# Patient Record
Sex: MALE | Race: WHITE | NOT HISPANIC OR LATINO | URBAN - METROPOLITAN AREA
[De-identification: names, ages, dates, MRNs, and addresses within clinical notes are randomized per-mention and may not be internally consistent; named-entity substitution may affect disease eponyms.]

---

## 2017-01-01 ENCOUNTER — OFFICE VISIT - HEALTHEAST (OUTPATIENT)
Dept: GERIATRICS | Facility: CLINIC | Age: 82
End: 2017-01-01

## 2017-01-01 ENCOUNTER — COMMUNICATION - HEALTHEAST (OUTPATIENT)
Dept: CARDIOLOGY | Facility: CLINIC | Age: 82
End: 2017-01-01

## 2017-01-01 ENCOUNTER — AMBULATORY - HEALTHEAST (OUTPATIENT)
Dept: GERIATRICS | Facility: CLINIC | Age: 82
End: 2017-01-01

## 2017-01-01 DIAGNOSIS — K59.00 CONSTIPATION, UNSPECIFIED CONSTIPATION TYPE: ICD-10-CM

## 2017-01-01 DIAGNOSIS — M15.9 OSTEOARTHRITIS OF MULTIPLE JOINTS, UNSPECIFIED OSTEOARTHRITIS TYPE: ICD-10-CM

## 2017-01-01 DIAGNOSIS — N40.0 BPH (BENIGN PROSTATIC HYPERPLASIA): ICD-10-CM

## 2017-01-01 DIAGNOSIS — I35.0 AORTIC VALVE STENOSIS: ICD-10-CM

## 2017-01-01 DIAGNOSIS — R33.9 URINARY RETENTION: ICD-10-CM

## 2017-01-01 DIAGNOSIS — I10 ESSENTIAL HYPERTENSION WITH GOAL BLOOD PRESSURE LESS THAN 140/90: ICD-10-CM

## 2017-01-01 DIAGNOSIS — R00.1 BRADYCARDIA: ICD-10-CM

## 2017-01-01 DIAGNOSIS — I25.10 CORONARY ARTERY DISEASE INVOLVING NATIVE CORONARY ARTERY OF NATIVE HEART WITHOUT ANGINA PECTORIS: ICD-10-CM

## 2017-01-01 DIAGNOSIS — I25.9 ISCHEMIC HEART DISEASE: ICD-10-CM

## 2017-01-01 DIAGNOSIS — N39.0 URINARY TRACT INFECTION: ICD-10-CM

## 2017-01-01 DIAGNOSIS — N40.0 BPH (BENIGN PROSTATIC HYPERTROPHY): ICD-10-CM

## 2017-01-01 DIAGNOSIS — E87.6 HYPOKALEMIA: ICD-10-CM

## 2017-01-01 DIAGNOSIS — R60.0 BILATERAL EDEMA OF LOWER EXTREMITY: ICD-10-CM

## 2017-01-01 DIAGNOSIS — M15.9 DJD (DEGENERATIVE JOINT DISEASE), MULTIPLE SITES: ICD-10-CM

## 2017-01-01 DIAGNOSIS — M15.0 PRIMARY OSTEOARTHRITIS INVOLVING MULTIPLE JOINTS: ICD-10-CM

## 2017-01-01 DIAGNOSIS — R63.5 WEIGHT GAIN: ICD-10-CM

## 2017-01-01 DIAGNOSIS — M19.90 DJD (DEGENERATIVE JOINT DISEASE): ICD-10-CM

## 2017-01-01 DIAGNOSIS — R60.0 LOWER EXTREMITY EDEMA: ICD-10-CM

## 2017-01-01 DIAGNOSIS — R60.0 LEG EDEMA: ICD-10-CM

## 2017-01-01 DIAGNOSIS — M54.9 BACK PAIN: ICD-10-CM

## 2017-01-01 DIAGNOSIS — I25.10 CAD (CORONARY ARTERY DISEASE): ICD-10-CM

## 2017-01-01 DIAGNOSIS — K21.9 GASTROESOPHAGEAL REFLUX DISEASE WITHOUT ESOPHAGITIS: ICD-10-CM

## 2017-01-01 RX ORDER — FUROSEMIDE 20 MG
20 TABLET ORAL DAILY
Status: SHIPPED | COMMUNITY
Start: 2017-01-01

## 2017-01-23 ENCOUNTER — OFFICE VISIT - HEALTHEAST (OUTPATIENT)
Dept: GERIATRICS | Facility: CLINIC | Age: 82
End: 2017-01-23

## 2017-01-23 DIAGNOSIS — I35.0 AORTIC VALVE STENOSIS: ICD-10-CM

## 2017-01-23 DIAGNOSIS — I10 ESSENTIAL HYPERTENSION: ICD-10-CM

## 2017-01-23 DIAGNOSIS — K21.9 GASTROESOPHAGEAL REFLUX DISEASE WITHOUT ESOPHAGITIS: ICD-10-CM

## 2017-01-23 DIAGNOSIS — M15.0 PRIMARY OSTEOARTHRITIS INVOLVING MULTIPLE JOINTS: ICD-10-CM

## 2017-01-23 DIAGNOSIS — I25.10 CAD (CORONARY ARTERY DISEASE): ICD-10-CM

## 2017-01-23 DIAGNOSIS — N40.0 BPH (BENIGN PROSTATIC HYPERTROPHY): ICD-10-CM

## 2017-01-23 DIAGNOSIS — R00.1 BRADYCARDIA: ICD-10-CM

## 2017-01-23 DIAGNOSIS — K59.00 CONSTIPATION, UNSPECIFIED CONSTIPATION TYPE: ICD-10-CM

## 2017-01-23 DIAGNOSIS — I25.9 ISCHEMIC HEART DISEASE: ICD-10-CM

## 2018-01-01 ENCOUNTER — RECORDS - HEALTHEAST (OUTPATIENT)
Dept: LAB | Facility: CLINIC | Age: 83
End: 2018-01-01

## 2018-01-01 ENCOUNTER — HOME CARE/HOSPICE - HEALTHEAST (OUTPATIENT)
Dept: HOSPICE | Facility: HOSPICE | Age: 83
End: 2018-01-01

## 2018-01-01 LAB
ALBUMIN UR-MCNC: NEGATIVE MG/DL
AMORPH CRY #/AREA URNS HPF: ABNORMAL /[HPF]
APPEARANCE UR: ABNORMAL
BACTERIA #/AREA URNS HPF: ABNORMAL HPF
BACTERIA SPEC CULT: ABNORMAL
BACTERIA SPEC CULT: ABNORMAL
BILIRUB UR QL STRIP: NEGATIVE
COLOR UR AUTO: YELLOW
GLUCOSE UR STRIP-MCNC: NEGATIVE MG/DL
HGB UR QL STRIP: ABNORMAL
KETONES UR STRIP-MCNC: NEGATIVE MG/DL
LEUKOCYTE ESTERASE UR QL STRIP: ABNORMAL
MUCOUS THREADS #/AREA URNS LPF: ABNORMAL LPF
NITRATE UR QL: POSITIVE
PH UR STRIP: 6.5 [PH] (ref 4.5–8)
RBC #/AREA URNS AUTO: ABNORMAL HPF
SP GR UR STRIP: 1.01 (ref 1–1.03)
SQUAMOUS #/AREA URNS AUTO: ABNORMAL LPF
UROBILINOGEN UR STRIP-ACNC: ABNORMAL
WBC #/AREA URNS AUTO: ABNORMAL HPF

## 2018-02-16 ENCOUNTER — AMBULATORY - HEALTHEAST (OUTPATIENT)
Dept: GERIATRICS | Facility: CLINIC | Age: 83
End: 2018-02-16

## 2018-02-16 ENCOUNTER — HOME CARE/HOSPICE - HEALTHEAST (OUTPATIENT)
Dept: HOSPICE | Facility: HOSPICE | Age: 83
End: 2018-02-16

## 2021-05-30 VITALS — WEIGHT: 183.4 LBS

## 2021-05-30 VITALS — WEIGHT: 171.9 LBS

## 2021-05-31 VITALS — WEIGHT: 195 LBS

## 2021-05-31 VITALS — WEIGHT: 193.6 LBS

## 2021-05-31 VITALS — WEIGHT: 187.2 LBS

## 2021-06-03 ENCOUNTER — RECORDS - HEALTHEAST (OUTPATIENT)
Dept: ADMINISTRATIVE | Facility: CLINIC | Age: 86
End: 2021-06-03

## 2021-06-08 NOTE — PROGRESS NOTES
Code Status:  DNR/DNI  Visit Type: Review Of Multiple Medical Conditions     Facility:  [facility] [[id number]]         Facility Type:  (Long Term Care, LTC)    History of Present Illness: Chintan Blevins is a 89 y.o. male who I am seeing today for follow up of chronic medical conditions. Pt has hx of DJD, ischemic heart disease, BPH,  GERDs and hypertension. Pt recently had an episode of chest pain, non radiating with no SOB. He was treated with nitro X 2 and pain resolved. He has had no further episodes. He does have asymptomatic bradycardia which has been chronic. He reports no dizziness or loss of consciousness.     Active Ambulatory Problems     Diagnosis Date Noted     Aortic valve stenosis, mild 01/08/2015     CAD (coronary artery disease) 01/08/2015     Hypertension 01/08/2015     Chest pain 01/08/2015     DJD (degenerative joint disease), multiple sites 09/28/2015     BPH (benign prostatic hypertrophy) 09/28/2015     Ischemic heart disease 09/28/2015     Skin lesion 09/28/2015     Gastroesophageal reflux disease 11/30/2015     Resolved Ambulatory Problems     Diagnosis Date Noted     No Resolved Ambulatory Problems     Past Medical History   Diagnosis Date     Atrioventricular block, first degree      Bradycardia      Compression fracture of T12 vertebra      Constipation      Dementia      Diverticulosis of large intestine without diverticulitis      DVT (deep venous thrombosis)      Dyslipidemia      GI hemorrhage      Glaucoma      MI (myocardial infarction) 2011     Osteoarthritis      Premature beats      Pulmonary hypertension      T12 compression fracture      Unstable angina        Updated MAR reviewed at the facility.     Current Outpatient Prescriptions   Medication Sig     acetaminophen (TYLENOL) 500 MG tablet Take 1,000 mg by mouth 3 (three) times a day.      aspirin 81 MG EC tablet Take 81 mg by mouth daily with breakfast.      bismuth subsalicylate (PEPTO BISMOL) 262 mg/15  "mL suspension Take 30 mL by mouth. X1 ONLY IF NEEDED     calcium carbonate-vit D3-min 600 mg calcium- 200 unit Tab Take 1 capsule by mouth 2 (two) times a day.      dorzolamide-timolol, PF, 2-0.5 % Dpet Administer 1 drop to the right eye 3 (three) times a day.     furosemide (LASIX) 20 MG tablet Take 20 mg by mouth 2 (two) times a day.      hydrALAZINE (APRESOLINE) 50 MG tablet Take 50 mg by mouth 3 (three) times a day. Hold for SBP less than 110 and HR less 55     latanoprost (XALATAN) 0.005 % ophthalmic solution Administer 1 drop to both eyes bedtime.      lisinopril (PRINIVIL,ZESTRIL) 20 MG tablet Take 20 mg by mouth 2 (two) times a day. Hold for SBP less 110 and HR less 55     menthol (ASPERCREME HEAT) 10 % Gel Apply 1 application topically 2 (two) times a day. Apply to Right knee pain     menthol (ASPERCREME HEAT) 10 % Gel Apply 1 application topically 2 (two) times a day as needed.     menthol-zinc oxide (RISAMINE) 0.44-20.6 % Oint ointment Apply 1 application topically 2 (two) times a day as needed (TO PENIS AREA).      metoprolol (LOPRESSOR) 25 MG tablet Take 25 mg by mouth daily. Hold for SBP less 110 and HR less 55.     multivitamin with minerals tablet Take 1 tablet by mouth daily.     nitroglycerin (NITROSTAT) 0.4 MG SL tablet Place 0.4 mg under the tongue every 5 (five) minutes as needed for chest pain (for a total of 3 tabs in 15 minutes. if needed for chest pain. ATTENTION NURSES!!! Please thouroughly assess pain to eliminate heartburn when resident report \"Chest pain.\" Per family, first ambulate resident, if this does not allieviate discomfort, administer Maalox. IF still no relief update family and MD.).      omeprazole (PRILOSEC) 20 MG capsule Take 20 mg by mouth every morning.      potassium chloride (KLOR-CON) 10 MEQ CR tablet Take 30 mEq by mouth 3 (three) times a day.      senna-docusate (PERICOLACE) 8.6-50 mg tablet Take 1 tablet by mouth 2 (two) times a day. hold if loose stool or > 2 bm's in " 1 day     simvastatin (ZOCOR) 10 MG tablet Take 10 mg by mouth bedtime.      tamsulosin (FLOMAX) 0.4 mg Cp24 Take 0.4 mg by mouth daily.      traMADol (ULTRAM) 50 mg tablet Take 25 mg by mouth 2 (two) times a day as needed for pain.         Allergies   Allergen Reactions     Aricept [Donepezil]      Codeine      Morphine          Review of Systems   No fevers or chills. No headache, lightheadedness or dizziness. No SOB, chest pains or palpitations. Recent chest pain relieved with Nitro X 2. No further episodes. Appetite is good. No nausea, vomiting, constipation or diarrhea. No GERD like symptoms. Chronic left knee pain and LE edema. No dysuria, frequency, burning or pain with urination. Otherwise review of systems are negative.       Physical Exam   PHYSICAL EXAMINATION:  Vital signs:   Vitals:    01/23/17 1421   BP: 160/80   Pulse: 68   Resp: 24   Temp: 98.1  F (36.7  C)   SpO2: 95%     General: Awake, Alert, appropriately, follows simple commands, conversant  HEENT:PERRLA, Pink conjunctiva, anicteric sclerae, moist oral mucosa, large prominence of nose.   NECK: Supple, without any lymphadenopathy, or masses  CVS:  S1  S2, without murmur or gallop.   LUNG: Clear to auscultation, No wheezes, rales or rhonci.  BACK: No kyphosis of the thoracic spine  ABDOMEN: Soft, nontender to palpation, with positive bowel sounds  EXTREMITIES: L knee effusion below patella chronic, unable to tolerate touch.  Limited ROM extended on wheelchair extender. Chronic lymphedema to BLE. edema, no calf tenderness.   SKIN: Warm and dry, no rashes or erythema noted  NEUROLOGIC: Intact, pulses palpable  PSYCHIATRIC: Mild cognitive deficit. Flat affect.     Assessment and Plan:  1. CAD (coronary artery disease)     2. Ischemic heart disease     3. Aortic valve stenosis     4. Primary osteoarthritis involving multiple joints     5. Essential hypertension     6. Bradycardia     7. BPH (benign prostatic hypertrophy)     8. Constipation,  unspecified constipation type     9. Gastroesophageal reflux disease without esophagitis       Pt with known ASCVD with CAD and Ischemic heart disease. Pt with recent chest pain relieved with Nitro X 2. He has had no further symptoms. He continues on mx antihypertensives including hydralazine, lisinopril and metoprolol. Blood pressures well controlled. He has asymptomatic bradycardia which is chronic with heart rates in the upper 50s to 70s. He may benefit from decrease in Metoprolol and addition of isosorbide. I will consult Dr. Echevarria. He continues on ASA and statin. Chronic debilitating DJD with chronic right knee pain. He continues on Tramadol, tylenol and aspercreme. He continues in a knee splint and wheel chair extender.  BPH controlled with Flomax. No urinary retention. Constipation controlled with Senna. Some increased cognitive impairment.     Electronically signed by: Ember Nolasco, SUSAN

## 2021-06-09 NOTE — PROGRESS NOTES
Code Status:  DNR/DNI  Visit Type: Review Of Multiple Medical Conditions     Facility:  [facility] [[id number]]         Facility Type:  (Long Term Care, LTC)    History of Present Illness: Chintan Blevins is a 89 y.o. male who I am seeing today for follow up of chronic medical conditions. Pt with hx of DJD,LE edema,  ischemic heart disease, BPH,  constipation and hypertension. Pt with increasing weight gain. He lasix was recently discontinued. He has increased LE edema. He reports occasional chest pain lasting ~5 minutes. He did not notified staff when he has these episodes. No radiation identified by pt. Vague symptomology.  Increasing blood pressures with low pulses. His Metoprolol was recently increased. He is sedentary. He has debilitating OA to his R knee.       Active Ambulatory Problems     Diagnosis Date Noted     Aortic valve stenosis, mild 01/08/2015     CAD (coronary artery disease) 01/08/2015     Hypertension 01/08/2015     Chest pain 01/08/2015     DJD (degenerative joint disease), multiple sites 09/28/2015     BPH (benign prostatic hypertrophy) 09/28/2015     Ischemic heart disease 09/28/2015     Skin lesion 09/28/2015     Gastroesophageal reflux disease 11/30/2015     Resolved Ambulatory Problems     Diagnosis Date Noted     No Resolved Ambulatory Problems     Past Medical History:   Diagnosis Date     Aortic valve stenosis, mild 2014     Atrioventricular block, first degree      BPH (benign prostatic hypertrophy)      Bradycardia      CAD (coronary artery disease) 2011     Chest pain      Compression fracture of T12 vertebra      Constipation      Dementia      Diverticulosis of large intestine without diverticulitis      DJD (degenerative joint disease), multiple sites      DVT (deep venous thrombosis)      Dyslipidemia      GI hemorrhage      Glaucoma      Hypertension      MI (myocardial infarction) 2011     Osteoarthritis      Premature beats      Pulmonary hypertension       "T12 compression fracture      Unstable angina        Updated MAR reviewed at the facility.     Current Outpatient Prescriptions   Medication Sig     acetaminophen (TYLENOL) 500 MG tablet Take 1,000 mg by mouth 3 (three) times a day.      aspirin 81 MG EC tablet Take 81 mg by mouth daily with breakfast.      bismuth subsalicylate (PEPTO BISMOL) 262 mg/15 mL suspension Take 30 mL by mouth. X1 ONLY IF NEEDED     calcium carbonate-vit D3-min 600 mg calcium- 200 unit Tab Take 1 capsule by mouth 2 (two) times a day.      dorzolamide-timolol, PF, 2-0.5 % Dpet Administer 1 drop to the right eye 3 (three) times a day.     furosemide (LASIX) 20 MG tablet Take 20 mg by mouth 2 (two) times a day.      hydrALAZINE (APRESOLINE) 50 MG tablet Take 50 mg by mouth 3 (three) times a day. Hold for SBP less than 110 and HR less 55     latanoprost (XALATAN) 0.005 % ophthalmic solution Administer 1 drop to both eyes bedtime.      lisinopril (PRINIVIL,ZESTRIL) 20 MG tablet Take 20 mg by mouth 2 (two) times a day. Hold for SBP less 110 and HR less 55     menthol (ASPERCREME HEAT) 10 % Gel Apply 1 application topically 2 (two) times a day. Apply to Right knee pain     menthol (ASPERCREME HEAT) 10 % Gel Apply 1 application topically 2 (two) times a day as needed.     menthol-zinc oxide (RISAMINE) 0.44-20.6 % Oint ointment Apply 1 application topically 2 (two) times a day as needed (TO PENIS AREA).      metoprolol (LOPRESSOR) 25 MG tablet Take 25 mg by mouth daily. Hold for SBP less 110 and HR less 55.     multivitamin with minerals tablet Take 1 tablet by mouth daily.     nitroglycerin (NITROSTAT) 0.4 MG SL tablet Place 0.4 mg under the tongue every 5 (five) minutes as needed for chest pain (for a total of 3 tabs in 15 minutes. if needed for chest pain. ATTENTION NURSES!!! Please thouroughly assess pain to eliminate heartburn when resident report \"Chest pain.\" Per family, first ambulate resident, if this does not allieviate discomfort, " administer Maalox. IF still no relief update family and MD.).      omeprazole (PRILOSEC) 20 MG capsule Take 20 mg by mouth every morning.      potassium chloride (KLOR-CON) 10 MEQ CR tablet Take 30 mEq by mouth 3 (three) times a day.      senna-docusate (PERICOLACE) 8.6-50 mg tablet Take 1 tablet by mouth 2 (two) times a day. hold if loose stool or > 2 bm's in 1 day     simvastatin (ZOCOR) 10 MG tablet Take 10 mg by mouth bedtime.      tamsulosin (FLOMAX) 0.4 mg Cp24 Take 0.4 mg by mouth daily.      traMADol (ULTRAM) 50 mg tablet Take 25 mg by mouth 2 (two) times a day as needed for pain.         Allergies   Allergen Reactions     Aricept [Donepezil]      Codeine      Morphine          Review of Systems   No fevers or chills. No headache, lightheadedness or dizziness. No SOB, recent chest pains without radiation. He did not tell the nursing staff.  No palpitations. Reports no symptoms with bradycardia. Appetite is good. No nausea, vomiting, constipation or diarrhea. No GERD like symptoms. Chronic left knee pain. Increased LE edema. No dysuria, frequency, burning or pain with urination. Reports increased tiredness.     Physical Exam   PHYSICAL EXAMINATION:  Vital signs:   Vitals:    03/27/17 2046   BP: 134/76   Pulse: (!) 56   Resp: 18   Temp: 98  F (36.7  C)   SpO2: 93%     General: Awake, Alert, appropriately, follows simple commands, conversant  HEENT:PERRLA, Pink conjunctiva, anicteric sclerae, moist oral mucosa, large prominence of nose.   NECK: Supple, without any lymphadenopathy, or masses  CVS:  S1  S2, without murmur or gallop.   LUNG: Clear to auscultation, No wheezes, rales or rhonci. No dyspnea at rest.   BACK: No kyphosis of the thoracic spine  ABDOMEN: Soft, nontender to palpation, with positive bowel sounds  EXTREMITIES: L knee effusion below patella chronic, unable to tolerate touch.  Limited ROM extended on wheelchair extender. Increased bilateral LE 3-4+.   SKIN: Warm and dry, no rashes or erythema  noted  NEUROLOGIC: Intact, pulses palpable  PSYCHIATRIC: Mild cognitive deficit. Flat affect.     Assessment and Plan:  1. Essential hypertension with goal blood pressure less than 140/90     2. Bradycardia     3. Leg edema     4. CAD (coronary artery disease)     5. DJD (degenerative joint disease), multiple sites     6. BPH (benign prostatic hypertrophy)     7. Aortic valve stenosis     8. Ischemic heart disease     9. Constipation, unspecified constipation type       Pt with increasing LE edema, recent CP and tiredness. Recently his lasix was discontinued. I will resume lasix at 20mg QD. He did not tell the nursing staff about CP event. He denies any radiation, associated SOB or palpations. He has known  ASCVD with CAD and Ischemic heart disease. I feel this could be related to fluid overload. He continues on Hydralazine 50mg TID, Lisinopril 20mg BID and Metoprolol 37.5 BID. This was also recently increased by Dr. Echevarria. He has bradycardia in which he has been asymptomatic but now with reports of tiredness. I will decrease to 25mg BID. I will also add isosorbide 5 mg QD. If syptoms or elevated BP persists would further workup and increase Isosorbide. He continues on ASA. He has prn Nitro. DJD of mx joints > on right knee. He continues on tramadol and Aspercreme. Recently Tylenol decreased. Will follow up with BMP.           Electronically signed by: Ember Nolasco CNP

## 2021-06-09 NOTE — PROGRESS NOTES
[facility]    Facility:   [facility]  [[id number]]   Code Status: DNR/DNI     Blood pressures have been showing progressive rise  He has reached systolic's of 200 this past 24 hours.  Weight has risen 3 pounds in the past month.  He's been afebrile.  No other change in medications.  Current blood pressure regimen consists of hydralazine 50 mg TID, lisinopril 20 mg BID, metoprolol 5 mg b.i.d.  Pulse rates range from 60 to 84.  Exam patient is sitting comfortably in his wheelchair willing himself up and down the villarreal.  Appears cheerful and no distress.  Assessment worsening blood pressure.  Plan increase metoprolol to 37.5 mg twice daily ultimately 250 mg twice daily if he tolerates.  If not improving and change hydralazine to amlodipine.  If continues to show progressive rising blood pressure then further evaluation including laboratory assessment and more detailed physical exam        Electronically signed by: Tyrone Echevarria MD

## 2021-06-09 NOTE — PROGRESS NOTES
[facility]    Facility:   [facility]  [[id number]]   Code Status: DNR/DNI     89-year-old man who has resided in long-term care for the past 5 years.  Morbidities include dementia and diffuse osteoarthritis that is limiting activity.  Patient is being seen today to review multiple medical problems    1. Aortic valve stenosis  No clinical signs of heart failure.  He denies orthopnea, PND, chest discomfort with effort, lightheadedness or dizziness.  On auscultation the murmur is not audible.  Heart rate is regular.    Assessment - aortic stenosis of minimal clinical significance due to his limited physical activity   2. Primary osteoarthritis involving multiple joints  Multiple joints.  The main areas of disability are knee pain especially on the right.  He has had chronic persisting pain with activity in the right knee.  Multiple interventions including steroid injections in orthopedic consultation have not led to reduction in his complaint. On the exam today he demonstrates no pain behaviors except when the patella is touched  he winces.  Exam of the knee reveals no heat, erythema, effusion, periarticular swelling, or palpable crepitance.  He is sensitive to light touch.  He gets some symptom relief from a neoprene knee sleeve.  Plan - continue supportive care.  He has been receiving scheduled APAP twice daily.  Will change this to PRN and see if he manages his pain the same as when he has scheduled meds   3. Essential hypertension with goal blood pressure less than 140/90  Multi-drug regimen that includes hydralazine lisinopril metoprolol and furosemide with potassium supplement. Discussed the affected his blood pressures are low normal.  Will begin slowly reducing his medication load while monitoring for worsening blood pressure   4. Constipation, unspecified constipation type  Receives a senna plus D once daily.  States that his bowels move adequately although not  "\"predictably\".  He is not uncomfortable.   5. Ischemic heart disease  Past history coronary artery disease.  He denies heart related symptoms.  Exam  Neck veins flat.  Lung sounds are clear.  Heart rate regular.  No audible murmur.  No abdominal bruit.  No distal edema.     Assessment - stable medical management.  Plan  - discontinue scheduled Tylenol.  Tylenol PRN.  Discontinue furosemide and KCl.  Monitor weight, blood pressure, cardistatus over the next month. If no deterioration in functional performance then begin weaning from hydralazine and reduce lisinopril    Electronically signed by: Tyrone Echevarria MD    "

## 2021-06-10 NOTE — PROGRESS NOTES
Code Status:  DNR/DNI  Visit Type: Review Of Multiple Medical Conditions     Facility:  [facility] [[id number]]         Facility Type:  (Long Term Care, LTC)    History of Present Illness: Chintan Blevins is a 89 y.o. male who I am seeing today for follow up of chronic medical conditions. Pt with hx of DJD,LE edema,  ischemic heart disease, BPH,  constipation and hypertension.  Patient's Lasix has been resumed secondary to increased weight gain edema and some vague symptomatology of chest pain.  No further symptomology.  I also added isosorbide.  Heart rates in the upper 50s to low 60s.      Active Ambulatory Problems     Diagnosis Date Noted     Aortic valve stenosis, mild 01/08/2015     CAD (coronary artery disease) 01/08/2015     Hypertension 01/08/2015     Chest pain 01/08/2015     DJD (degenerative joint disease), multiple sites 09/28/2015     BPH (benign prostatic hypertrophy) 09/28/2015     Ischemic heart disease 09/28/2015     Skin lesion 09/28/2015     Gastroesophageal reflux disease 11/30/2015     Resolved Ambulatory Problems     Diagnosis Date Noted     No Resolved Ambulatory Problems     Past Medical History:   Diagnosis Date     Aortic valve stenosis, mild 2014     Atrioventricular block, first degree      BPH (benign prostatic hypertrophy)      Bradycardia      CAD (coronary artery disease) 2011     Chest pain      Compression fracture of T12 vertebra      Constipation      Dementia      Diverticulosis of large intestine without diverticulitis      DJD (degenerative joint disease), multiple sites      DVT (deep venous thrombosis)      Dyslipidemia      GI hemorrhage      Glaucoma      Hypertension      MI (myocardial infarction) 2011     Osteoarthritis      Premature beats      Pulmonary hypertension      T12 compression fracture      Unstable angina        Updated MAR reviewed at the facility.     Current Outpatient Prescriptions   Medication Sig     acetaminophen (TYLENOL) 500 MG  "tablet Take 1,000 mg by mouth 3 (three) times a day.      aspirin 81 MG EC tablet Take 81 mg by mouth daily with breakfast.      bismuth subsalicylate (PEPTO BISMOL) 262 mg/15 mL suspension Take 30 mL by mouth. X1 ONLY IF NEEDED     calcium carbonate-vit D3-min 600 mg calcium- 200 unit Tab Take 1 capsule by mouth 2 (two) times a day.      dorzolamide-timolol, PF, 2-0.5 % Dpet Administer 1 drop to the right eye 3 (three) times a day.     furosemide (LASIX) 20 MG tablet Take 20 mg by mouth daily.     hydrALAZINE (APRESOLINE) 50 MG tablet Take 50 mg by mouth 3 (three) times a day. Hold for SBP less than 110 and HR less 55     latanoprost (XALATAN) 0.005 % ophthalmic solution Administer 1 drop to both eyes bedtime.      lisinopril (PRINIVIL,ZESTRIL) 20 MG tablet Take 20 mg by mouth 2 (two) times a day. Hold for SBP less 110 and HR less 55     menthol (ASPERCREME HEAT) 10 % Gel Apply 1 application topically 2 (two) times a day. Apply to Right knee pain     menthol (ASPERCREME HEAT) 10 % Gel Apply 1 application topically 2 (two) times a day as needed.     menthol-zinc oxide (RISAMINE) 0.44-20.6 % Oint ointment Apply 1 application topically 2 (two) times a day as needed (TO PENIS AREA).      metoprolol (LOPRESSOR) 25 MG tablet Take 25 mg by mouth daily. Hold for SBP less 110 and HR less 55.     multivitamin with minerals tablet Take 1 tablet by mouth daily.     nitroglycerin (NITROSTAT) 0.4 MG SL tablet Place 0.4 mg under the tongue every 5 (five) minutes as needed for chest pain (for a total of 3 tabs in 15 minutes. if needed for chest pain. ATTENTION NURSES!!! Please thouroughly assess pain to eliminate heartburn when resident report \"Chest pain.\" Per family, first ambulate resident, if this does not allieviate discomfort, administer Maalox. IF still no relief update family and MD.).      omeprazole (PRILOSEC) 20 MG capsule Take 20 mg by mouth every morning.      potassium chloride SA (K-DUR,KLOR-CON) 20 MEQ tablet Take " 20 mEq by mouth daily.     senna-docusate (PERICOLACE) 8.6-50 mg tablet Take 1 tablet by mouth 2 (two) times a day. hold if loose stool or > 2 bm's in 1 day     simvastatin (ZOCOR) 10 MG tablet Take 10 mg by mouth bedtime.      tamsulosin (FLOMAX) 0.4 mg Cp24 Take 0.4 mg by mouth daily.      traMADol (ULTRAM) 50 mg tablet Take 25 mg by mouth 2 (two) times a day as needed for pain.         Allergies   Allergen Reactions     Aricept [Donepezil]      Codeine      Morphine          Review of Systems   No fevers or chills. No headache, lightheadedness or dizziness. No SOB.  No further chest pain. No palpitations. Reports no symptoms with bradycardia. Appetite is good. No nausea, vomiting, constipation or diarrhea. No GERD like symptoms. Chronic left knee pain. Increased LE edema. No dysuria, frequency, burning or pain with urination.       Physical Exam   PHYSICAL EXAMINATION:  Vital signs:   Vitals:    05/08/17 1822   BP: 126/69   Pulse: (!) 59   Resp: 19   Temp: 97.5  F (36.4  C)   SpO2: 95%     General: Awake, Alert, appropriately, follows simple commands, conversant  HEENT:PERRLA, Pink conjunctiva, anicteric sclerae, moist oral mucosa, large prominence of nose.   NECK: Supple, without any lymphadenopathy, or masses  CVS:  S1  S2, without murmur or gallop.   LUNG: Clear to auscultation, No wheezes, rales or rhonci. No dyspnea at rest.   BACK: No kyphosis of the thoracic spine  ABDOMEN: Soft, nontender to palpation, with positive bowel sounds  EXTREMITIES: L knee effusion below patella chronic, unable to tolerate touch.  Limited ROM extended on wheelchair extender. Increased bilateral LE 3+.   SKIN: Warm and dry, no rashes or erythema noted  NEUROLOGIC: Intact, pulses palpable  PSYCHIATRIC: Mild cognitive deficit. Flat affect.     Assessment and Plan:  1. Ischemic heart disease     2. DJD (degenerative joint disease), multiple sites     3. Leg edema     4. Essential hypertension with goal blood pressure less than  140/90     5. Bradycardia     6. Aortic valve stenosis     7. BPH (benign prostatic hypertrophy)       Pt with previous vague CP symptoms.  He was seen and I added isosorbide 5 mg daily.  Previously been taken off his Lasix and was having some increased edema.  This is now improved.  He continues on Lasix.  Occasional bradycardia with heart rate in the mid 50s to low 60s.  Asymptomatic.  Blood pressures are satisfactory controlled.  No further hypertension.  He is also on potassium supplement for hypokalemia.  Potassium is stable.  DJD with chronic right knee pain.  He continues in splinting.  He is on tramadol and topical anti-inflammatory.  BPH no urinary retention.    Electronically signed by: Ember Nolasco, SUSAN

## 2021-06-10 NOTE — PROGRESS NOTES
Code Status:  DNR/DNI  Visit Type: Review Of Multiple Medical Conditions     Facility:  [facility] [[id number]]         Facility Type:  (Long Term Care, LTC)    History of Present Illness: Chintan Blevins is a 89 y.o. male who I am seeing today for follow up of chronic medical conditions. Pt with hx of DJD,LE edema,  ischemic heart disease, BPH,  constipation and hypertension. Pt recently seen  with increasing weight gain. He lasix was recently discontinued. He had increased LE edema with occasional reports of CP. No radiation identified by pt. Vague symptomology. He had increasing blood pressures with low pulses. He is on mx antihypertensives. Heart rates in the upper 50s and low 60s. I added isosorbide. His lasix was resumed. He developed hypokalemia with K+ of 3.4. He potassium supplement was resumed. Today K+ 3.3. His edema is improved. BNP was 190. He denies any further CP.       Active Ambulatory Problems     Diagnosis Date Noted     Aortic valve stenosis, mild 01/08/2015     CAD (coronary artery disease) 01/08/2015     Hypertension 01/08/2015     Chest pain 01/08/2015     DJD (degenerative joint disease), multiple sites 09/28/2015     BPH (benign prostatic hypertrophy) 09/28/2015     Ischemic heart disease 09/28/2015     Skin lesion 09/28/2015     Gastroesophageal reflux disease 11/30/2015     Resolved Ambulatory Problems     Diagnosis Date Noted     No Resolved Ambulatory Problems     Past Medical History:   Diagnosis Date     Aortic valve stenosis, mild 2014     Atrioventricular block, first degree      BPH (benign prostatic hypertrophy)      Bradycardia      CAD (coronary artery disease) 2011     Chest pain      Compression fracture of T12 vertebra      Constipation      Dementia      Diverticulosis of large intestine without diverticulitis      DJD (degenerative joint disease), multiple sites      DVT (deep venous thrombosis)      Dyslipidemia      GI hemorrhage      Glaucoma       Hypertension      MI (myocardial infarction) 2011     Osteoarthritis      Premature beats      Pulmonary hypertension      T12 compression fracture      Unstable angina        Updated MAR reviewed at the facility.     Current Outpatient Prescriptions   Medication Sig     acetaminophen (TYLENOL) 500 MG tablet Take 1,000 mg by mouth 3 (three) times a day.      aspirin 81 MG EC tablet Take 81 mg by mouth daily with breakfast.      bismuth subsalicylate (PEPTO BISMOL) 262 mg/15 mL suspension Take 30 mL by mouth. X1 ONLY IF NEEDED     calcium carbonate-vit D3-min 600 mg calcium- 200 unit Tab Take 1 capsule by mouth 2 (two) times a day.      dorzolamide-timolol, PF, 2-0.5 % Dpet Administer 1 drop to the right eye 3 (three) times a day.     furosemide (LASIX) 20 MG tablet Take 20 mg by mouth daily.     hydrALAZINE (APRESOLINE) 50 MG tablet Take 50 mg by mouth 3 (three) times a day. Hold for SBP less than 110 and HR less 55     latanoprost (XALATAN) 0.005 % ophthalmic solution Administer 1 drop to both eyes bedtime.      lisinopril (PRINIVIL,ZESTRIL) 20 MG tablet Take 20 mg by mouth 2 (two) times a day. Hold for SBP less 110 and HR less 55     menthol (ASPERCREME HEAT) 10 % Gel Apply 1 application topically 2 (two) times a day. Apply to Right knee pain     menthol (ASPERCREME HEAT) 10 % Gel Apply 1 application topically 2 (two) times a day as needed.     menthol-zinc oxide (RISAMINE) 0.44-20.6 % Oint ointment Apply 1 application topically 2 (two) times a day as needed (TO PENIS AREA).      metoprolol (LOPRESSOR) 25 MG tablet Take 25 mg by mouth daily. Hold for SBP less 110 and HR less 55.     multivitamin with minerals tablet Take 1 tablet by mouth daily.     nitroglycerin (NITROSTAT) 0.4 MG SL tablet Place 0.4 mg under the tongue every 5 (five) minutes as needed for chest pain (for a total of 3 tabs in 15 minutes. if needed for chest pain. ATTENTION NURSES!!! Please thouroughly assess pain to eliminate heartburn when  "resident report \"Chest pain.\" Per family, first ambulate resident, if this does not allieviate discomfort, administer Maalox. IF still no relief update family and MD.).      omeprazole (PRILOSEC) 20 MG capsule Take 20 mg by mouth every morning.      potassium chloride SA (K-DUR,KLOR-CON) 20 MEQ tablet Take 20 mEq by mouth daily.     senna-docusate (PERICOLACE) 8.6-50 mg tablet Take 1 tablet by mouth 2 (two) times a day. hold if loose stool or > 2 bm's in 1 day     simvastatin (ZOCOR) 10 MG tablet Take 10 mg by mouth bedtime.      tamsulosin (FLOMAX) 0.4 mg Cp24 Take 0.4 mg by mouth daily.      traMADol (ULTRAM) 50 mg tablet Take 25 mg by mouth 2 (two) times a day as needed for pain.         Allergies   Allergen Reactions     Aricept [Donepezil]      Codeine      Morphine          Review of Systems   No fevers or chills. No headache, lightheadedness or dizziness. No SOB, recent chest pains without radiation. He did not tell the nursing staff.  No palpitations. Reports no symptoms with bradycardia. Appetite is good. No nausea, vomiting, constipation or diarrhea. No GERD like symptoms. Chronic left knee pain. Increased LE edema. No dysuria, frequency, burning or pain with urination. Reports increased tiredness.     Physical Exam   PHYSICAL EXAMINATION:  Vital signs:   Vitals:    04/10/17 2140   BP: 131/66   Pulse: (!) 57   Resp: 20   Temp: 97.5  F (36.4  C)   SpO2: 95%     General: Awake, Alert, appropriately, follows simple commands, conversant  HEENT:PERRLA, Pink conjunctiva, anicteric sclerae, moist oral mucosa, large prominence of nose.   NECK: Supple, without any lymphadenopathy, or masses  CVS:  S1  S2, without murmur or gallop.   LUNG: Clear to auscultation, No wheezes, rales or rhonci. No dyspnea at rest.   BACK: No kyphosis of the thoracic spine  ABDOMEN: Soft, nontender to palpation, with positive bowel sounds  EXTREMITIES: L knee effusion below patella chronic, unable to tolerate touch.  Limited ROM extended " on wheelchair extender. Increased bilateral LE 3+.   SKIN: Warm and dry, no rashes or erythema noted  NEUROLOGIC: Intact, pulses palpable  PSYCHIATRIC: Mild cognitive deficit. Flat affect.     Assessment and Plan:  1. Essential hypertension with goal blood pressure less than 140/90     2. Leg edema     3. Hypokalemia     4. Bradycardia     5. Aortic valve stenosis     6. Ischemic heart disease     7. DJD (degenerative joint disease), multiple sites       Recent increasing LE edema with elevated blood pressures and CP. Hx of ischemic cardiomyopathy and hypertension on mx antihypertensives. I added isosorbide 5 mg QD and resumed his lasix at 20mg QD. No further CP. Asymptomatic bradycardia. Could attempt to further decrease his metoprolol and push up on isosorbide. He also continues on lisinopril 20 mg BID and hydralazine 50 mg TID. Hypokalemia. I resumed is potasium supplement. Today potassium 3.3. I will increase to 30 meq QD. Follow up K+ on Thursday. DJD with chronic left knee pain well controlled with muscle rub and tramadol.     Electronically signed by: Ember Nolasco, CNP

## 2021-06-11 NOTE — PROGRESS NOTES
Code Status:  DNR/DNI  Visit Type: Review Of Multiple Medical Conditions     Facility:  [facility] [[id number]]         Facility Type:  (Long Term Care, LTC)    History of Present Illness: Chintan Blevins is a 89 y.o. male who I am seeing today for follow up of chronic medical conditions. Pt with hx of DJD,LE edema,  ischemic heart disease, BPH,  constipation and hypertension.  Patient with increasing discomfort and lower back and bottom.  He was seen by OT for wheelchair evaluation.  Support was added to the lumbar spine as well as change in seating.  Does like to sit up for majority of the day however nursing is encouraging him to lie down.  He always keeps a wheelchair extender on the right lower extremity secondary to DJD and chronic knee pain.    Active Ambulatory Problems     Diagnosis Date Noted     Aortic valve stenosis, mild 01/08/2015     CAD (coronary artery disease) 01/08/2015     Hypertension 01/08/2015     Chest pain 01/08/2015     DJD (degenerative joint disease), multiple sites 09/28/2015     BPH (benign prostatic hypertrophy) 09/28/2015     Ischemic heart disease 09/28/2015     Skin lesion 09/28/2015     Gastroesophageal reflux disease 11/30/2015     Resolved Ambulatory Problems     Diagnosis Date Noted     No Resolved Ambulatory Problems     Past Medical History:   Diagnosis Date     Aortic valve stenosis, mild 2014     Atrioventricular block, first degree      BPH (benign prostatic hypertrophy)      Bradycardia      CAD (coronary artery disease) 2011     Chest pain      Compression fracture of T12 vertebra      Constipation      Dementia      Diverticulosis of large intestine without diverticulitis      DJD (degenerative joint disease), multiple sites      DVT (deep venous thrombosis)      Dyslipidemia      GI hemorrhage      Glaucoma      Hypertension      MI (myocardial infarction) 2011     Osteoarthritis      Premature beats      Pulmonary hypertension      T12 compression  "fracture      Unstable angina        Updated MAR reviewed at the facility.     Current Outpatient Prescriptions   Medication Sig     acetaminophen (TYLENOL) 500 MG tablet Take 1,000 mg by mouth 3 (three) times a day.      aspirin 81 MG EC tablet Take 81 mg by mouth daily with breakfast.      bismuth subsalicylate (PEPTO BISMOL) 262 mg/15 mL suspension Take 30 mL by mouth. X1 ONLY IF NEEDED     calcium carbonate-vit D3-min 600 mg calcium- 200 unit Tab Take 1 capsule by mouth 2 (two) times a day.      dorzolamide-timolol, PF, 2-0.5 % Dpet Administer 1 drop to the right eye 3 (three) times a day.     furosemide (LASIX) 20 MG tablet Take 20 mg by mouth daily.     hydrALAZINE (APRESOLINE) 50 MG tablet Take 50 mg by mouth 3 (three) times a day. Hold for SBP less than 110 and HR less 55     latanoprost (XALATAN) 0.005 % ophthalmic solution Administer 1 drop to both eyes bedtime.      lisinopril (PRINIVIL,ZESTRIL) 20 MG tablet Take 20 mg by mouth 2 (two) times a day. Hold for SBP less 110 and HR less 55     menthol (ASPERCREME HEAT) 10 % Gel Apply 1 application topically 2 (two) times a day. Apply to Right knee pain     menthol (ASPERCREME HEAT) 10 % Gel Apply 1 application topically 2 (two) times a day as needed.     menthol-zinc oxide (RISAMINE) 0.44-20.6 % Oint ointment Apply 1 application topically 2 (two) times a day as needed (TO PENIS AREA).      metoprolol (LOPRESSOR) 25 MG tablet Take 25 mg by mouth daily. Hold for SBP less 110 and HR less 55.     multivitamin with minerals tablet Take 1 tablet by mouth daily.     nitroglycerin (NITROSTAT) 0.4 MG SL tablet Place 0.4 mg under the tongue every 5 (five) minutes as needed for chest pain (for a total of 3 tabs in 15 minutes. if needed for chest pain. ATTENTION NURSES!!! Please thouroughly assess pain to eliminate heartburn when resident report \"Chest pain.\" Per family, first ambulate resident, if this does not allieviate discomfort, administer Maalox. IF still no relief " update family and MD.).      omeprazole (PRILOSEC) 20 MG capsule Take 20 mg by mouth every morning.      potassium chloride SA (K-DUR,KLOR-CON) 20 MEQ tablet Take 20 mEq by mouth daily.     senna-docusate (PERICOLACE) 8.6-50 mg tablet Take 1 tablet by mouth 2 (two) times a day. hold if loose stool or > 2 bm's in 1 day     simvastatin (ZOCOR) 10 MG tablet Take 10 mg by mouth bedtime.      tamsulosin (FLOMAX) 0.4 mg Cp24 Take 0.4 mg by mouth daily.      traMADol (ULTRAM) 50 mg tablet Take 25 mg by mouth 2 (two) times a day as needed for pain.         Allergies   Allergen Reactions     Aricept [Donepezil]      Codeine      Morphine          Review of Systems   No fevers or chills. No headache, lightheadedness or dizziness. No SOB.  No further chest pain. No palpitations. Reports no symptoms with bradycardia. Appetite is good. No nausea, vomiting, constipation or diarrhea. No GERD like symptoms. Chronic left knee pain.  Chronic LE edema. No dysuria, frequency, burning or pain with urination.  Increasing cognitive impairment.  Discomfort of the lower spine and bottom.      Physical Exam   PHYSICAL EXAMINATION:  Vital signs:   Vitals:    06/05/17 2123   BP: 141/69   Pulse: (!) 59   Resp: 18   Temp: 97.8  F (36.6  C)   SpO2: 98%     General: Awake, Alert, appropriately, follows simple commands, conversant  HEENT:PERRLA, Pink conjunctiva, anicteric sclerae, moist oral mucosa, large prominence of nose.   NECK: Supple, without any lymphadenopathy, or masses  CVS:  S1  S2, without murmur or gallop.   LUNG: Clear to auscultation, No wheezes, rales or rhonci. No dyspnea at rest.   BACK: No kyphosis of the thoracic spine no focal tenderness to the lumbar spine.. He does have lumbar support in his wheelchair.  ABDOMEN: Soft, nontender to palpation, with positive bowel sounds  EXTREMITIES: L knee effusion below patella chronic, unable to tolerate touch.  Limited ROM extended on wheelchair extender. Increased bilateral LE 3+.    SKIN: Warm and dry, no rashes or erythema noted  NEUROLOGIC: Intact, pulses palpable  PSYCHIATRIC: Mild cognitive deficit. Flat affect.  Patient does not recognize his own sweater with his name on it.    Assessment and Plan:  1. DJD (degenerative joint disease), multiple sites     2. Back pain     3. Ischemic heart disease     4. Aortic valve stenosis     5. BPH (benign prostatic hypertrophy)     6. Leg edema     7. CAD (coronary artery disease)     8. Bradycardia     9. Essential hypertension with goal blood pressure less than 140/90       Patient with you to multiple joints.  Chronic left knee pain well controlled with tramadol and topical anti-inflammatory.  Sensitive to touch.  Keeps it extended all up in wheelchair.  He does sit up for a large majority of the day.  Staff are encouraging him to lay down.  Recent discomfort in his lumbar spine as well as bottom.  He was seen by OT and has now lumbar support in his wheelchair as well as improved seating.  Continue to encourage lying down.  BPH well controlled with tamsulosin.  No obstruction or retention.  Essential hypertension.  I see one elevated blood pressure however greatly improved with recent addition of isosorbide and reinstating his Lasix.  Chronic lower extremity edema somewhat improved with resuming his Lasix.  We will continue to monitor renal function.  CAD.  No further chest pain with use of a source of I.  Asymptomatic bradycardia.      Electronically signed by: Ember Nolasco CNP

## 2021-06-12 NOTE — PROGRESS NOTES
Code Status:  DNR/DNI  Visit Type: Review Of Multiple Medical Conditions     Facility:  [facility] [[id number]]         Facility Type:  (Long Term Care, LTC)    History of Present Illness: Chintan Blevins is a 89 y.o. male who I am seeing today for follow up of chronic medical conditions including DJD with chronic right knee pain,LE edema,  ischemic heart disease, BPH,  constipation and hypertension.  Pt conditions stable. He has had some bradycardia but denies any symptoms. His hydralazine and lisinopril have been held 6 times over the last 14 days but he has continued to receive his Metoprolol.     Active Ambulatory Problems     Diagnosis Date Noted     Aortic valve stenosis, mild 01/08/2015     CAD (coronary artery disease) 01/08/2015     Hypertension 01/08/2015     Chest pain 01/08/2015     DJD (degenerative joint disease), multiple sites 09/28/2015     BPH (benign prostatic hypertrophy) 09/28/2015     Ischemic heart disease 09/28/2015     Skin lesion 09/28/2015     Gastroesophageal reflux disease 11/30/2015     Resolved Ambulatory Problems     Diagnosis Date Noted     No Resolved Ambulatory Problems     Past Medical History:   Diagnosis Date     Aortic valve stenosis, mild 2014     Atrioventricular block, first degree      BPH (benign prostatic hypertrophy)      Bradycardia      CAD (coronary artery disease) 2011     Chest pain      Compression fracture of T12 vertebra      Constipation      Dementia      Diverticulosis of large intestine without diverticulitis      DJD (degenerative joint disease), multiple sites      DVT (deep venous thrombosis)      Dyslipidemia      GI hemorrhage      Glaucoma      Hypertension      MI (myocardial infarction) 2011     Osteoarthritis      Premature beats      Pulmonary hypertension      T12 compression fracture      Unstable angina        Updated MAR reviewed at the facility.         Allergies   Allergen Reactions     Aricept [Donepezil]      Codeine       Morphine          Review of Systems   No fevers or chills. No headache, lightheadedness or dizziness. No SOB or CP. No palpitations. Reports no symptoms with bradycardia. Appetite is good. No nausea, vomiting, constipation or diarrhea. No GERD like symptoms. Chronic left knee pain.  Chronic LE edema. No dysuria, frequency, burning or pain with urination.  Increasing cognitive impairment.  Mild discomfort over the clavicle on the right.       Physical Exam   PHYSICAL EXAMINATION:  Vital signs:   Vitals:    09/11/17 1537   BP: 153/80   Pulse: (!) 58   Resp: 18   Temp: 97.8  F (36.6  C)   SpO2: 96%     General: Awake, Alert, appropriately, follows simple commands, conversant  HEENT:PERRLA, Pink conjunctiva, anicteric sclerae, moist oral mucosa, large prominence of nose.   NECK: Supple, without any lymphadenopathy, or masses  CVS:  S1  S2, without murmur or gallop. Some mild tenderness over the mid right clavicle. No bruising. Some soft tissue palpable over bone.   LUNG: Clear to auscultation, No wheezes, rales or rhonci.   BACK: No kyphosis of the thoracic spine no focal tenderness to the lumbar spine.. He does have lumbar support in his wheelchair.  ABDOMEN: Soft, nontender to palpation, with positive bowel sounds  EXTREMITIES: L knee effusion below patella chronic, unable to tolerate touch. Throws hands in the air when I attempt to palpate.  Limited ROM extended on wheelchair extender. Increased bilateral LE 3+.   SKIN: Warm and dry, no rashes or erythema noted  NEUROLOGIC: Intact, pulses palpable  PSYCHIATRIC: Mild cognitive deficit. Flat affect.      Assessment and Plan:  1. Ischemic heart disease     2. Aortic valve stenosis     3. Bradycardia     4. DJD (degenerative joint disease), multiple sites     5. Bilateral edema of lower extremity     6. Osteoarthritis of multiple joints, unspecified osteoarthritis type     7. Constipation, unspecified constipation type     8. BPH (benign prostatic hypertrophy)     9.  Essential hypertension with goal blood pressure less than 140/90     10. CAD (coronary artery disease)     11. Gastroesophageal reflux disease without esophagitis       Pt with known ischemic heart disease. No CP of recent. He continues on vasodilator. Asymptomatic bradycardia with heart rates in the 50s. He continues on Metoprolol 25 mg BID. I will decrease to 12.5mg BID. He continues on Hydralazine and lisinopril for BP control. They have held this 6 times out of the last 14 days due to low heart rate. I will remove HR parameters from these 2 meds and add this to Metoprolol. Blood pressures with rebound hypertension noted when held. Constipation improved with Senna. GERDS no longer on PPI. No complains. BPH well controlled with Flomax. Chronic LE edema. He continues on Lasix.   Chronic left knee pain well controlled with tramadol and topical anti-inflammatory.  Sensitive to touch.      Electronically signed by:   Ember Nolasco, CNP

## 2021-06-12 NOTE — PROGRESS NOTES
[facility]    Facility:   [facility]  [[id number]]   Code Status: DNR/DNI     89-year-old man who has resided in long-term care for the past 5.5 years.  Morbidities include dementia and diffuse osteoarthritis that is limiting activity.  Patient is being seen today to review multiple medical problems    1. Ischemic heart disease   known CAD.  No acute events.  Minimally physically active but denies dyspnea on exertion.  Heart rhythm is regular with normal rate.  No neck vein distention.  Lung sounds are clear.  Denies precordial pain with exertion.   2. Aortic valve stenosis   high-pitched midsystolic murmur.  No diastolic component.  No signs of heart failure.   3. Osteoarthritis of multiple joints, unspecified osteoarthritis type   multiple joints have been affected.  Primary complaint is right knee pain.  This is chronic and constant.  He agrees that it is been present for several years preceding his arrival in the long-term care.  Multiple interventions including physical therapy, NSAID's, analgesics, corticosteroid injections, and orthopedic consultation have been no available to relieve his complaint of discomfort.  What is interesting is that he does not exhibit significant pain behaviors.  Light touch causes him to be anxious and to push my hand away from examining his knee.  There is no palpable or visible effusion or discoloration.  Left knee is not uncomfortable.  If this is not a fixed psychological idea and is a true manifestation of pain, the quality of the pain has the appearance of a neuropathy.  We will see if his insurance will cover the cost of lighted cane patch or lighted Derm gel.  If not then a trial of Voltaren gel for him to apply independently might offer some help for discomfort.   4. BPH (benign prostatic hypertrophy)   no active complaints.   5. Bilateral edema of lower extremity   this waxes and wanes.  At times he has had compression dressings  applied.  He has 2+ edema of the ankles 1+ of the distal foreleg.  None of the proximal foreleg.  This is improvement compared to previous exams.   6. Constipation, unspecified constipation type   bowels are moving adequately.  He is receiving Senna D on a scheduled basis.   7. Essential hypertension with goal blood pressure less than 140/90   blood pressures normal.       Exam-vital signs reviewed over the past month.  Normal and stable.  20 pound weight gain in the past 3 months.  14 of that in the past month.  Edema appears improved  From previous exams.  General-patient is found sitting in his chair on the porch.  He is willing to be seen for examination and propels his wheelchair to his room accurately.  Appears comfortable and only exhibits a type of pain behavior when his right knee is gently touched.  HEENT-normal.  Neck-no adenopathy, thyroid enlargement or venous distention.  Lungs-clear bilaterally.  Heart-normal rate regular rhythm.  Abdomen-no distention.  Normoactive bowel sounds.  Skin-normal turgor for age.  Extremities-skin intact and well cared for.  2+ ankle edema 1+ distal foreleg edema no edema of the proximal foreleg.    Assessment-unexplained weight gain that is best accounted for by improved nutrition.  Not due to worsening edema.  Quality of life continues to be affected by chronic right knee pain.  Will try a topical such as Voltaren gel or lidocaine patch for symptom relief      Electronically signed by: Tyrone Echevarria MD

## 2021-06-13 NOTE — PROGRESS NOTES
Code Status:  DNR/DNI  Visit Type: Problem Visit     Facility:  [facility] [[id number]]         Facility Type:  (Long Term Care, LTC)    History of Present Illness: Chintan Blevins is a 89 y.o. male who I am seeing today for follow up of recent urinary rentention. Pt with chronic medical conditions including DJD with chronic right knee pain,LE edema,  ischemic heart disease, BPH,  constipation and hypertension. On last exam pt reported no voiding. Nursing staff reported that he was incontinent. I did have them obtain PVR and bladder US. He was retaining urine with residuals >400cc. An on call was notified and reyes catheter place. Urine is CYU on exam. He denies any further like symptoms. He continues on Flomax 0.4mg QD.     Active Ambulatory Problems     Diagnosis Date Noted     Aortic valve stenosis, mild 01/08/2015     CAD (coronary artery disease) 01/08/2015     Hypertension 01/08/2015     Chest pain 01/08/2015     DJD (degenerative joint disease), multiple sites 09/28/2015     BPH (benign prostatic hypertrophy) 09/28/2015     Ischemic heart disease 09/28/2015     Skin lesion 09/28/2015     Gastroesophageal reflux disease 11/30/2015     Resolved Ambulatory Problems     Diagnosis Date Noted     No Resolved Ambulatory Problems     Past Medical History:   Diagnosis Date     Aortic valve stenosis, mild 2014     Atrioventricular block, first degree      BPH (benign prostatic hypertrophy)      Bradycardia      CAD (coronary artery disease) 2011     Chest pain      Compression fracture of T12 vertebra      Constipation      Dementia      Diverticulosis of large intestine without diverticulitis      DJD (degenerative joint disease), multiple sites      DVT (deep venous thrombosis)      Dyslipidemia      GI hemorrhage      Glaucoma      Hypertension      MI (myocardial infarction) 2011     Osteoarthritis      Premature beats      Pulmonary hypertension      T12 compression fracture      Unstable angina         Updated MAR reviewed at the facility.         Allergies   Allergen Reactions     Aricept [Donepezil]      Codeine      Morphine          Review of Systems   Refer to HPI otherwise negative.        Physical Exam   PHYSICAL EXAMINATION:  Vital signs:   Vitals:    10/18/17 1411   BP: 132/78   Pulse: 69   Resp: 19   Temp: (!) 96.4  F (35.8  C)   SpO2: 96%     General: Awake, Alert, appropriately, follows simple commands, conversant   NECK: Supple   BACK: No kyphosis  ABDOMEN: Soft, nontender to palpation, with positive bowel sounds  : Clear yellow urine from reyes catheter.   EXTREMITIES: LE edema with wrapping in place.   SKIN: Warm and dry, no rashes or erythema noted  NEUROLOGIC: Intact, pulses palpable  PSYCHIATRIC: Mild cognitive deficit. Flat affect.      Assessment and Plan:  1. Urinary retention     2. BPH (benign prostatic hyperplasia)       Pt with hx of BPH with recent urinary retention and increased incontinence. Bladder scans with urine >400cc. A reyes was placed using 16 fr Cuade catheter. Pt reports relief of urinary symptoms. He continues on Flomax 0.4mg QD. I will increase to 0.8 mg. I would hope to be able to remove catheter early next week. If failed attempts with increase in medication would leave long term.     Electronically signed by:   Ember Nolasco, SUSAN

## 2021-06-13 NOTE — PROGRESS NOTES
Code Status:  DNR/DNI  Visit Type: Problem Visit     Facility:  [facility] [[id number]]         Facility Type:  (Long Term Care, LTC)    History of Present Illness: Chintan Blevins is a 89 y.o. male who I am seeing today for follow up of recent UTI with urinary retention. Past medical history includes DJD with chronic right knee pain,LE edema,  ischemic heart disease, BPH,  constipation and hypertension. Recent increase in his flomax to 0.8mg with no improvement. A caude catheter was place. This has been removed and UA/UC obtained. UA positive with culture showing mixture of organisms. Pt was treated with Bactrim DS.   He reports some improvement of symptoms. Pt reports continence now however nursing staff tell me he is totally incontinent.       Active Ambulatory Problems     Diagnosis Date Noted     Aortic valve stenosis, mild 01/08/2015     CAD (coronary artery disease) 01/08/2015     Hypertension 01/08/2015     Chest pain 01/08/2015     DJD (degenerative joint disease), multiple sites 09/28/2015     BPH (benign prostatic hypertrophy) 09/28/2015     Ischemic heart disease 09/28/2015     Skin lesion 09/28/2015     Gastroesophageal reflux disease 11/30/2015     Resolved Ambulatory Problems     Diagnosis Date Noted     No Resolved Ambulatory Problems     Past Medical History:   Diagnosis Date     Aortic valve stenosis, mild 2014     Atrioventricular block, first degree      BPH (benign prostatic hypertrophy)      Bradycardia      CAD (coronary artery disease) 2011     Chest pain      Compression fracture of T12 vertebra      Constipation      Dementia      Diverticulosis of large intestine without diverticulitis      DJD (degenerative joint disease), multiple sites      DVT (deep venous thrombosis)      Dyslipidemia      GI hemorrhage      Glaucoma      Hypertension      MI (myocardial infarction) 2011     Osteoarthritis      Premature beats      Pulmonary hypertension      T12 compression  fracture      Unstable angina        Updated MAR reviewed at the facility.         Allergies   Allergen Reactions     Aricept [Donepezil]      Codeine      Morphine          Review of Systems   Refer to HPI otherwise negative.        Physical Exam   PHYSICAL EXAMINATION:  Vital signs:   Vitals:    10/30/17 1932   BP: 107/63   Pulse: 70   Resp: 17   Temp: 98.2  F (36.8  C)   SpO2: 96%     General: Awake, Alert, appropriately, follows simple commands, conversant   NECK: Supple   BACK: No kyphosis  ABDOMEN: Soft, nontender to palpation, with positive bowel sounds  SKIN: Warm and dry, no rashes or erythema noted  NEUROLOGIC: Intact, pulses palpable  PSYCHIATRIC: Mild cognitive deficit. Flat affect.      Assessment and Plan:  1. BPH (benign prostatic hyperplasia)     2. Coronary artery disease involving native coronary artery of native heart without angina pectoris     3. Urinary retention     4. Urinary tract infection       Pt with hx of BPH with recent obstruction with retention. Flomax increased with no improvement in symptoms. Catheter placed temporary but has been removed. UA positive. He has completed 3 days of antibiotics. Will extend this to 5 days with some improvement in symptoms. I will have staff bladder scan him Q shift X 24 hours.       Electronically signed by:   Ember Nolasco, CNP

## 2021-06-13 NOTE — PROGRESS NOTES
Code Status:  DNR/DNI  Visit Type: Review Of Multiple Medical Conditions     Facility:  [facility] [[id number]]         Facility Type:  (Long Term Care, LTC)    History of Present Illness: Chintan Blevins is a 89 y.o. male who I am seeing today for follow up of chronic medical conditions including DJD with chronic right knee pain,LE edema,  ischemic heart disease, BPH,  constipation and hypertension. Today pt complains of constipation however look back shows he is having bowel movements every day. He also reports urinary retention. He has hx of BPH. He continues on Flomax. Nursing staff report increased incontinence. Recent decrease in his Metoprolol. Bradycardia improved. Blood pressures satisfactory controlled.       Active Ambulatory Problems     Diagnosis Date Noted     Aortic valve stenosis, mild 01/08/2015     CAD (coronary artery disease) 01/08/2015     Hypertension 01/08/2015     Chest pain 01/08/2015     DJD (degenerative joint disease), multiple sites 09/28/2015     BPH (benign prostatic hypertrophy) 09/28/2015     Ischemic heart disease 09/28/2015     Skin lesion 09/28/2015     Gastroesophageal reflux disease 11/30/2015     Resolved Ambulatory Problems     Diagnosis Date Noted     No Resolved Ambulatory Problems     Past Medical History:   Diagnosis Date     Aortic valve stenosis, mild 2014     Atrioventricular block, first degree      BPH (benign prostatic hypertrophy)      Bradycardia      CAD (coronary artery disease) 2011     Chest pain      Compression fracture of T12 vertebra      Constipation      Dementia      Diverticulosis of large intestine without diverticulitis      DJD (degenerative joint disease), multiple sites      DVT (deep venous thrombosis)      Dyslipidemia      GI hemorrhage      Glaucoma      Hypertension      MI (myocardial infarction) 2011     Osteoarthritis      Premature beats      Pulmonary hypertension      T12 compression fracture      Unstable angina         Updated MAR reviewed at the facility.         Allergies   Allergen Reactions     Aricept [Donepezil]      Codeine      Morphine          Review of Systems   No fevers or chills. No headache, lightheadedness or dizziness. No SOB or CP. No palpitations. Appetite is good. No nausea, vomiting, constipation or diarrhea. No GERD like symptoms. Chronic left knee pain.  Chronic LE edema. Pt reports constipation and urinary retention. Nursing staff report incontinence. Increasing cognitive impairment.         Physical Exam   PHYSICAL EXAMINATION:  Vital signs:   Vitals:    10/11/17 2122   BP: 135/81   Pulse: 83   Resp: 18   Temp: (!) 96.4  F (35.8  C)   SpO2: 97%     General: Awake, Alert, appropriately, follows simple commands, conversant  HEENT:PERRLA, Pink conjunctiva, anicteric sclerae, moist oral mucosa, large prominence of nose.   NECK: Supple, without any lymphadenopathy, or masses  CVS:  S1  S2, without murmur or gallop. Some mild tenderness over the mid right clavicle. No bruising. Some soft tissue palpable over bone.   LUNG: Clear to auscultation, No wheezes, rales or rhonci.   BACK: No kyphosis of the thoracic spine no focal tenderness to the lumbar spine.. He does have lumbar support in his wheelchair.  ABDOMEN: Soft, nontender to palpation, with positive bowel sounds  EXTREMITIES: L knee effusion below patella chronic, unable to tolerate touch. Throws hands in the air when I attempt to palpate.  Limited ROM extended on wheelchair extender. Increased bilateral LE 3+.   SKIN: Warm and dry, no rashes or erythema noted  NEUROLOGIC: Intact, pulses palpable  PSYCHIATRIC: Mild cognitive deficit. Flat affect.      Assessment and Plan:  1. Urinary retention     2. BPH (benign prostatic hyperplasia)     3. DJD (degenerative joint disease), multiple sites     4. Ischemic heart disease     5. Aortic valve stenosis     6. Bradycardia     7. Bilateral edema of lower extremity     8. Essential hypertension with goal  blood pressure less than 140/90     9. Constipation, unspecified constipation type       Pt with increasing cognitive impairment. Today he reports constipation however he is having bowel movements 1-2 a day. He continues on Senna S BID. He also reports urinary retention. However nursing staff report he is mostly incontinent. He does have hx of BPH. He continues on Flomax. I will have them check bladder scan and PVD X 48. Ischemic heart disease. No CP or palpitations. He was having asymptomatic bradycardia. I recently decreased his Metoprolol. Bradycardia improved. Blood pressures adequately controlled. He continues on Hydralazine and lisinopril for BP control. Chronic LE edema. He continues on Lasix.   Chronic left knee pain well controlled with tramadol and topical anti-inflammatory.  Sensitive to touch.      Electronically signed by:   Ember Nolasco, SUSAN

## 2021-06-13 NOTE — PROGRESS NOTES
Code Status:  DNR/DNI  Visit Type: Problem Visit     Facility:  [facility] [[id number]]         Facility Type:  (Long Term Care, LTC)    History of Present Illness: Chintan Blevins is a 89 y.o. male who I am seeing today for follow up of recent urinary rentention. Pt with chronic medical conditions including DJD with chronic right knee pain,LE edema,  ischemic heart disease, BPH,  constipation and hypertension. Recent increase in his flomax to 0.8mg with no improvement. A caude catheter was place. He reports some improvement of symptoms. Today we discuss removing his reyes catheter. However I will rule out UTI. He has had increased urinary incontinence prior to recent events.         Active Ambulatory Problems     Diagnosis Date Noted     Aortic valve stenosis, mild 01/08/2015     CAD (coronary artery disease) 01/08/2015     Hypertension 01/08/2015     Chest pain 01/08/2015     DJD (degenerative joint disease), multiple sites 09/28/2015     BPH (benign prostatic hypertrophy) 09/28/2015     Ischemic heart disease 09/28/2015     Skin lesion 09/28/2015     Gastroesophageal reflux disease 11/30/2015     Resolved Ambulatory Problems     Diagnosis Date Noted     No Resolved Ambulatory Problems     Past Medical History:   Diagnosis Date     Aortic valve stenosis, mild 2014     Atrioventricular block, first degree      BPH (benign prostatic hypertrophy)      Bradycardia      CAD (coronary artery disease) 2011     Chest pain      Compression fracture of T12 vertebra      Constipation      Dementia      Diverticulosis of large intestine without diverticulitis      DJD (degenerative joint disease), multiple sites      DVT (deep venous thrombosis)      Dyslipidemia      GI hemorrhage      Glaucoma      Hypertension      MI (myocardial infarction) 2011     Osteoarthritis      Premature beats      Pulmonary hypertension      T12 compression fracture      Unstable angina        Updated MAR reviewed at the  facility.         Allergies   Allergen Reactions     Aricept [Donepezil]      Codeine      Morphine          Review of Systems   Refer to HPI otherwise negative.        Physical Exam   PHYSICAL EXAMINATION:  Vital signs:   Vitals:    10/29/17 1710   BP: 138/88   Pulse: 65   Resp: 16   Temp: 98.4  F (36.9  C)   SpO2: 94%     General: Awake, Alert, appropriately, follows simple commands, conversant   NECK: Supple   BACK: No kyphosis  ABDOMEN: Soft, nontender to palpation, with positive bowel sounds  : Clear yellow urine from reyes catheter.   EXTREMITIES: LE edema with wrapping in place.   SKIN: Warm and dry, no rashes or erythema noted  NEUROLOGIC: Intact, pulses palpable  PSYCHIATRIC: Mild cognitive deficit. Flat affect.      Assessment and Plan:  1. Urinary retention     2. BPH (benign prostatic hyperplasia)     3. Urinary tract infection       Male with hx of BPH with increased urinary incontinence and urinary retention. Initially treated with increase in his Flomax to 0.8mg, then with caude catheter inserted. His catheter recently removed and UA/UC obtained to rule out UTI. He has been a-febrile. Underlying cognitive impairment. He reports urinary retention symptoms again since catheter removed. UA looks positive. Will await results of UC.     Electronically signed by:   Ember Nolasco, CNP

## 2021-06-13 NOTE — PROGRESS NOTES
Code Status:  DNR/DNI  Visit Type: Problem Visit     Facility:  [facility] [[id number]]         Facility Type:  (Long Term Care, LTC)    History of Present Illness: Chintan Blevins is a 89 y.o. male who I am seeing today for follow up of recent urinary rentention. Pt with chronic medical conditions including DJD with chronic right knee pain,LE edema,  ischemic heart disease, BPH,  constipation and hypertension. Pt recently seen after reyes catheter placed after mx PVR > 400cc. Pt flomax increased to 0.8 mg . No further urinary retention. Urine clear.     Active Ambulatory Problems     Diagnosis Date Noted     Aortic valve stenosis, mild 01/08/2015     CAD (coronary artery disease) 01/08/2015     Hypertension 01/08/2015     Chest pain 01/08/2015     DJD (degenerative joint disease), multiple sites 09/28/2015     BPH (benign prostatic hypertrophy) 09/28/2015     Ischemic heart disease 09/28/2015     Skin lesion 09/28/2015     Gastroesophageal reflux disease 11/30/2015     Resolved Ambulatory Problems     Diagnosis Date Noted     No Resolved Ambulatory Problems     Past Medical History:   Diagnosis Date     Aortic valve stenosis, mild 2014     Atrioventricular block, first degree      BPH (benign prostatic hypertrophy)      Bradycardia      CAD (coronary artery disease) 2011     Chest pain      Compression fracture of T12 vertebra      Constipation      Dementia      Diverticulosis of large intestine without diverticulitis      DJD (degenerative joint disease), multiple sites      DVT (deep venous thrombosis)      Dyslipidemia      GI hemorrhage      Glaucoma      Hypertension      MI (myocardial infarction) 2011     Osteoarthritis      Premature beats      Pulmonary hypertension      T12 compression fracture      Unstable angina        Updated MAR reviewed at the facility.         Allergies   Allergen Reactions     Aricept [Donepezil]      Codeine      Morphine          Review of Systems   Refer to  HPI otherwise negative.        Physical Exam   PHYSICAL EXAMINATION:  Vital signs:   Vitals:    10/23/17 2053   BP: 119/54   Pulse: 65   Resp: 18   Temp: 98.2  F (36.8  C)   SpO2: 94%     General: Awake, Alert, appropriately, follows simple commands, conversant   NECK: Supple   BACK: No kyphosis  ABDOMEN: Soft, nontender to palpation, with positive bowel sounds  : Clear yellow urine from reyes catheter.   EXTREMITIES: LE edema with wrapping in place.   SKIN: Warm and dry, no rashes or erythema noted  NEUROLOGIC: Intact, pulses palpable  PSYCHIATRIC: Mild cognitive deficit. Flat affect.      Assessment and Plan:  1. Urinary retention     2. BPH (benign prostatic hyperplasia)       Pt with hx of BPH now with obstructive uropathy leading to reyes catheter placement. He had some difficulty with cathing. He continues on Flomax with recent increase to 0.8mg. I will attempt to d/c his reyes. I will have staff obtain urine sample prior to removal. Will bladder scan Q shift. Will follow up in 24 hours for need of reyes replacement. I did speak to him and family regarding long term catheter placement and risk of infection.       Electronically signed by:   Ember Nolasco, CNP

## 2021-06-14 NOTE — PROGRESS NOTES
[facility]    Facility:   [facility] [[id number]]   Code Status: DNR/DNI      CHIEF COMPLAINT/REASON FOR VISIT:  Chief Complaint   Patient presents with     Problem Visit     Lower extremity edema, hypertension, hypokalemia, degenerative joint disease, chronic right knee pain.  Urinary incontinence with recent urinary tract infection.       HISTORY:      HPI: Chintan is a 89 y.o. male who resides here at the [facility] long-term [facility] for multiple medical problems.  They include degenerative joint disease with chronic right knee pain.  He also has chronic lower extremity edema and his weight has gone up to 194.  He does have ischemic heart disease and benign prostatic hypertrophy did have a catheter in the past.  He did have a urinalysis that was UA was positive for culture showing mixture of organisms however likely asymptomatic bacteriuria.  He was treated with Bactrim at that time.  His symptoms did improve and now he is incontinent of urine.  Nurse reports to me that his lower extremity edema has worsened however he has not had any chest pain or shortness of breath respiratory rate and O2 sats have been within normal limits.  They have no other concerns today at this time.    Today patient feels that he does not have any pain in his knee.  He says he is feeling fine has no trouble breathing is sleeping okay.  He still is incontinent of urine and is wearing a depends at this time.  He does have lower extremity edema however it is very minimally pitting at this time and he denies any shortness of breath or chest pain.    Past Medical History:   Diagnosis Date     Aortic valve stenosis, mild 2014     Atrioventricular block, first degree      BPH (benign prostatic hypertrophy)      Bradycardia      CAD (coronary artery disease) 2011     Chest pain      Compression fracture of T12 vertebra      Constipation      Dementia      Diverticulosis of large intestine without  diverticulitis      DJD (degenerative joint disease), multiple sites      DVT (deep venous thrombosis)     X2 POST OP     Dyslipidemia      GI hemorrhage      Glaucoma      Hypertension      MI (myocardial infarction) 2011     Osteoarthritis      Premature beats      Pulmonary hypertension      T12 compression fracture      Unstable angina              Family History   Problem Relation Age of Onset     Heart disease Mother      No Medical Problems Father      Multiple sclerosis Daughter       in her 50's     Social History     Social History     Marital status:      Spouse name: Virginia     Number of children: 4     Years of education: BS     Occupational History     retired           Social History Main Topics     Smoking status: Former Smoker     Types: Cigarettes     Smokeless tobacco: None     Alcohol use No     Drug use: No     Sexual activity: Not Asked     Other Topics Concern     None     Social History Narrative    Samantha, his step daughter, came with him to [facility] on 8 Song 15        Santino had four biological children and five step children with his second wife after his first wife  of cancer         Review of Systems   Constitutional:        Patient denies any pain fevers chills nausea vomiting diarrhea change in vision hearing taste or smell weakness one side the other.  He has no chest pain or shortness of breath he is however incontinent of urine in's and sometimes stool but denies any other issues and the remainder the review of systems is negative.       .  Vitals:    17 0739   BP: 136/83   Pulse: 75   Temp: 97.5  F (36.4  C)   SpO2: 92%       Physical Exam   Constitutional: He appears well-nourished. No distress.   HENT:   Head: Normocephalic and atraumatic.   Deformity at the base of his naris that has not changed.   Eyes: Right eye exhibits no discharge. Left eye exhibits no discharge. No scleral icterus.   Neck: Neck supple. No thyromegaly present.    Cardiovascular: Normal rate and regular rhythm.    Pulmonary/Chest: Effort normal and breath sounds normal. No respiratory distress. He has no wheezes. He has no rales.   Abdominal: Soft. Bowel sounds are normal. He exhibits no distension. There is no tenderness. There is no rebound.   Musculoskeletal: He exhibits edema.   Lymphadenopathy:     He has no cervical adenopathy.   Neurological: He is alert. He exhibits normal muscle tone.   Skin: Skin is warm and dry. He is not diaphoretic.   Psychiatric: He has a normal mood and affect. His behavior is normal.         LABS: Review of labs done on 4/10/2017 revealed the past potassium of 3.4 which went up to 3.9 on 4/13/2017.  The rest of the basic metabolic profile on 4/10/2017 was within normal limits.      ASSESSMENT:      ICD-10-CM    1. Lower extremity edema R60.0    2. Weight gain R63.5    3. DJD (degenerative joint disease) M19.90    4. BPH (benign prostatic hyperplasia) N40.0    5. Urinary retention R33.9    6. Essential hypertension with goal blood pressure less than 140/90 I10    7. Ischemic heart disease I25.9        PLAN: Plan at this time we will get a weight today and continue to monitor.  I was going to increase his Lasix however since I did not think his edema was too bad at this time and his lungs were clear I will await for the basic metabolic profile.  We will also await the weight today.  His degenerative joint disease seems to be under good control and he is moving his urine without difficulty.  I did palpate his suprapubic area and he does not seem to be retaining anything at this time.  His blood pressures are in acceptable range at this time with his last blood pressure on 2/17/2017 was 136/83.  He does not have any signs and symptoms chest pain or shortness of breath and seems ischemic heart disease under good control.  Nurse practitioner will evaluate the need for increased Lasix on next visit and I will continue to monitor above medical  problems.  Until then he will be weighed on Monday Wednesday and Friday so we can accurately assess if he is retaining fluids at this time.  I will continue to monitor above medical problems and no other changes to care plan at this time.      Total  minutes of which % was spent counseling and coordination of care of the above plan.    Electronically signed by: Armando Ingram DO

## 2021-06-16 PROBLEM — E87.20 LACTIC ACIDOSIS: Status: ACTIVE | Noted: 2018-01-01

## 2021-06-16 PROBLEM — J18.9 HCAP (HEALTHCARE-ASSOCIATED PNEUMONIA): Status: ACTIVE | Noted: 2018-01-01

## 2021-06-16 PROBLEM — J96.01 ACUTE HYPOXEMIC RESPIRATORY FAILURE (H): Status: ACTIVE | Noted: 2018-01-01

## 2021-06-16 PROBLEM — N39.0 URINARY TRACT INFECTION WITHOUT HEMATURIA: Status: ACTIVE | Noted: 2018-01-01

## 2021-06-16 PROBLEM — A41.9 SEPTIC SHOCK (H): Status: ACTIVE | Noted: 2018-01-01

## 2021-06-16 PROBLEM — R65.21 SEPTIC SHOCK (H): Status: ACTIVE | Noted: 2018-01-01
